# Patient Record
Sex: MALE | Race: WHITE | NOT HISPANIC OR LATINO | Employment: FULL TIME | URBAN - METROPOLITAN AREA
[De-identification: names, ages, dates, MRNs, and addresses within clinical notes are randomized per-mention and may not be internally consistent; named-entity substitution may affect disease eponyms.]

---

## 2018-05-13 ENCOUNTER — APPOINTMENT (OUTPATIENT)
Dept: GENERAL RADIOLOGY | Facility: CLINIC | Age: 48
End: 2018-05-13
Attending: EMERGENCY MEDICINE
Payer: COMMERCIAL

## 2018-05-13 ENCOUNTER — APPOINTMENT (OUTPATIENT)
Dept: CT IMAGING | Facility: CLINIC | Age: 48
End: 2018-05-13
Attending: EMERGENCY MEDICINE
Payer: COMMERCIAL

## 2018-05-13 ENCOUNTER — HOSPITAL ENCOUNTER (EMERGENCY)
Facility: CLINIC | Age: 48
Discharge: HOME | End: 2018-05-13
Attending: EMERGENCY MEDICINE | Admitting: EMERGENCY MEDICINE
Payer: COMMERCIAL

## 2018-05-13 VITALS
OXYGEN SATURATION: 94 % | WEIGHT: 236.2 LBS | TEMPERATURE: 98 F | HEART RATE: 62 BPM | RESPIRATION RATE: 16 BRPM | DIASTOLIC BLOOD PRESSURE: 87 MMHG | SYSTOLIC BLOOD PRESSURE: 137 MMHG

## 2018-05-13 DIAGNOSIS — M54.2 NECK PAIN: ICD-10-CM

## 2018-05-13 DIAGNOSIS — R07.89 ATYPICAL CHEST PAIN: ICD-10-CM

## 2018-05-13 DIAGNOSIS — R06.02 SHORTNESS OF BREATH: ICD-10-CM

## 2018-05-13 LAB
ANION GAP SERPL CALCULATED.3IONS-SCNC: 7 MMOL/L (ref 3–14)
BASOPHILS # BLD AUTO: 0 10E9/L (ref 0–0.2)
BASOPHILS NFR BLD AUTO: 0 %
BUN SERPL-MCNC: 12 MG/DL (ref 7–30)
CALCIUM SERPL-MCNC: 8.3 MG/DL (ref 8.5–10.1)
CHLORIDE SERPL-SCNC: 107 MMOL/L (ref 94–109)
CO2 SERPL-SCNC: 27 MMOL/L (ref 20–32)
CREAT SERPL-MCNC: 0.76 MG/DL (ref 0.66–1.25)
D DIMER PPP FEU-MCNC: 0.7 UG/ML FEU (ref 0–0.5)
DIFFERENTIAL METHOD BLD: ABNORMAL
EOSINOPHIL # BLD AUTO: 0.2 10E9/L (ref 0–0.7)
EOSINOPHIL NFR BLD AUTO: 2 %
ERYTHROCYTE [DISTWIDTH] IN BLOOD BY AUTOMATED COUNT: 12.8 % (ref 10–15)
GFR SERPL CREATININE-BSD FRML MDRD: >90 ML/MIN/1.7M2
GLUCOSE SERPL-MCNC: 110 MG/DL (ref 70–99)
HCT VFR BLD AUTO: 45.8 % (ref 40–53)
HGB BLD-MCNC: 15.3 G/DL (ref 13.3–17.7)
LYMPHOCYTES # BLD AUTO: 6.3 10E9/L (ref 0.8–5.3)
LYMPHOCYTES NFR BLD AUTO: 72 %
MCH RBC QN AUTO: 28.8 PG (ref 26.5–33)
MCHC RBC AUTO-ENTMCNC: 33.4 G/DL (ref 31.5–36.5)
MCV RBC AUTO: 86 FL (ref 78–100)
MONOCYTES # BLD AUTO: 0.5 10E9/L (ref 0–1.3)
MONOCYTES NFR BLD AUTO: 6 %
NEUTROPHILS # BLD AUTO: 1.7 10E9/L (ref 1.6–8.3)
NEUTROPHILS NFR BLD AUTO: 20 %
PLATELET # BLD AUTO: 120 10E9/L (ref 150–450)
PLATELET # BLD EST: ABNORMAL 10*3/UL
POTASSIUM SERPL-SCNC: 3.8 MMOL/L (ref 3.4–5.3)
RBC # BLD AUTO: 5.32 10E12/L (ref 4.4–5.9)
RBC MORPH BLD: NORMAL
SODIUM SERPL-SCNC: 141 MMOL/L (ref 133–144)
TROPONIN I SERPL-MCNC: <0.015 UG/L (ref 0–0.04)
VARIANT LYMPHS BLD QL SMEAR: PRESENT
WBC # BLD AUTO: 8.7 10E9/L (ref 4–11)

## 2018-05-13 PROCEDURE — 96360 HYDRATION IV INFUSION INIT: CPT | Performed by: EMERGENCY MEDICINE

## 2018-05-13 PROCEDURE — 25000128 H RX IP 250 OP 636: Performed by: EMERGENCY MEDICINE

## 2018-05-13 PROCEDURE — 84484 ASSAY OF TROPONIN QUANT: CPT | Performed by: EMERGENCY MEDICINE

## 2018-05-13 PROCEDURE — 85379 FIBRIN DEGRADATION QUANT: CPT | Performed by: EMERGENCY MEDICINE

## 2018-05-13 PROCEDURE — 85025 COMPLETE CBC W/AUTO DIFF WBC: CPT | Performed by: EMERGENCY MEDICINE

## 2018-05-13 PROCEDURE — 71260 CT THORAX DX C+: CPT

## 2018-05-13 PROCEDURE — 93010 ELECTROCARDIOGRAM REPORT: CPT | Mod: Z6 | Performed by: EMERGENCY MEDICINE

## 2018-05-13 PROCEDURE — 71045 X-RAY EXAM CHEST 1 VIEW: CPT

## 2018-05-13 PROCEDURE — 70491 CT SOFT TISSUE NECK W/DYE: CPT

## 2018-05-13 PROCEDURE — 96361 HYDRATE IV INFUSION ADD-ON: CPT | Performed by: EMERGENCY MEDICINE

## 2018-05-13 PROCEDURE — 25000125 ZZHC RX 250: Performed by: EMERGENCY MEDICINE

## 2018-05-13 PROCEDURE — 80048 BASIC METABOLIC PNL TOTAL CA: CPT | Performed by: EMERGENCY MEDICINE

## 2018-05-13 PROCEDURE — 99285 EMERGENCY DEPT VISIT HI MDM: CPT | Mod: 25 | Performed by: EMERGENCY MEDICINE

## 2018-05-13 PROCEDURE — 93005 ELECTROCARDIOGRAM TRACING: CPT | Performed by: EMERGENCY MEDICINE

## 2018-05-13 RX ORDER — IOPAMIDOL 755 MG/ML
100 INJECTION, SOLUTION INTRAVASCULAR ONCE
Status: COMPLETED | OUTPATIENT
Start: 2018-05-13 | End: 2018-05-13

## 2018-05-13 RX ADMIN — SODIUM CHLORIDE 100 ML: 9 INJECTION, SOLUTION INTRAVENOUS at 10:29

## 2018-05-13 RX ADMIN — SODIUM CHLORIDE 1000 ML: 9 INJECTION, SOLUTION INTRAVENOUS at 08:46

## 2018-05-13 RX ADMIN — IOPAMIDOL 100 ML: 755 INJECTION, SOLUTION INTRAVENOUS at 10:29

## 2018-05-13 ASSESSMENT — ENCOUNTER SYMPTOMS
ARTHRALGIAS: 0
ABDOMINAL PAIN: 0
DIFFICULTY URINATING: 0
COLOR CHANGE: 0
NECK STIFFNESS: 0
SHORTNESS OF BREATH: 1
FEVER: 0
CONFUSION: 0
HEADACHES: 0
NECK PAIN: 1
NAUSEA: 1
SINUS PRESSURE: 1
EYE REDNESS: 0

## 2018-05-13 NOTE — ED AVS SNAPSHOT
[facility], [place], Emergency Department    [address]    [facility] [address]    Phone:  [phone]    Fax:  [phone]                                       Richard Klein   MRN: [id number]    Department:  [facility], Emergency Department   Date of Visit:  5/13/2018           After Visit Summary Signature Page     I have received my discharge instructions, and my questions have been answered. I have discussed any challenges I see with this plan with the nurse or doctor.    ..........................................................................................................................................  Patient/Patient Representative Signature      ..........................................................................................................................................  Patient Representative Print Name and Relationship to Patient    ..................................................               ................................................  Date                                            Time    ..........................................................................................................................................  Reviewed by Signature/Title    ...................................................              ..............................................  Date                                                            Time

## 2018-05-13 NOTE — ED AVS SNAPSHOT
[facility], Emergency Department    [address]    [place] [address]    Phone:  [phone]    Fax:  [phone]                                       Richard Klein   MRN: [id number]    Department:  [facility], Emergency Department   Date of Visit:  5/13/2018           Patient Information     Date Of Birth          1970        Your diagnoses for this visit were:     Atypical chest pain     Shortness of breath     Neck pain        You were seen by Morales Shetty MD.        Discharge Instructions       Take ranitidine as directed.  Contact your clinic tomorrow to arrange outpatient stress test.    Return if worsening symptoms or other concerns.    Discharge References/Attachments     CHEST PAIN, UNCERTAIN CAUSE (ENGLISH)    GERD (ADULT) (ENGLISH)      24 Hour Appointment Hotline       To make an appointment at any [facility] clinic, call [phone] ([phone]). If you don't have a family doctor or clinic, we will help you find one. [facility] clinics are conveniently located to serve the needs of you and your family.             Review of your medicines      START taking        Dose / Directions Last dose taken    ranitidine 150 MG tablet   Commonly known as:  ZANTAC   Dose:  150 mg   Quantity:  30 tablet        Take 1 tablet (150 mg) by mouth 2 times daily for 15 days   Refills:  0          Our records show that you are taking the medicines listed below. If these are incorrect, please call your family doctor or clinic.        Dose / Directions Last dose taken    AMLODIPINE BESYLATE PO   Dose:  5 mg        Take 5 mg by mouth daily   Refills:  0        AMOXICILLIN PO   Dose:  500 mg   Indication:  root canal        Take 500 mg by mouth   Refills:  0                Prescriptions were sent or printed at these locations (1 Prescription)                   Other Prescriptions                Printed at Department/Unit printer (1 of 1)         ranitidine (ZANTAC) 150 MG tablet               "  Procedures and tests performed during your visit     Basic metabolic panel    CBC with platelets differential    CT Chest Pulmonary Embolism w Contrast    CT Soft Tissue Neck w Contrast    Chest  XR, 1 view portable    D dimer quantitative    EKG 12 lead    Peripheral IV catheter    Troponin I      Orders Needing Specimen Collection     None      Pending Results     No orders found from 2018 to 2018.            Pending Culture Results     No orders found from 2018 to 2018.            Pending Results Instructions     If you had any lab results that were not finalized at the time of your Discharge, you can call the ED Lab Result RN at [phone]. You will be contacted by this team for any positive Lab results or changes in treatment. The nurses are available 7 days a week from 10A to 6:30P.  You can leave a message 24 hours per day and they will return your call.        Thank you for choosing [facility]       Thank you for choosing [facility] for your care. Our goal is always to provide you with excellent care. Hearing back from our patients is one way we can continue to improve our services. Please take a few minutes to complete the written survey that you may receive in the mail after you visit with us. Thank you!        [employer] Information     [employer] lets you send messages to your doctor, view your test results, renew your prescriptions, schedule appointments and more. To sign up, go to [email or web address] . Click on \"Log in\" on the left side of the screen, which will take you to the Welcome page. Then click on \"Sign up Now\" on the right side of the page.     You will be asked to enter the access code listed below, as well as some personal information. Please follow the directions to create your username and password.     Your access code is: [email or web address]  Expires: 2018 11:15 AM     Your access code will  in 90 days. If you need help or a new code, please call your [facility] " [facility] or [phone].        Care EveryWhere ID     This is your Care EveryWhere ID. This could be used by other organizations to access your [place] medical records  [id number]        Equal Access to Services     CHRIS JEONG : Kayleigh Yip, wasebastianda luqadaha, qaybta kaalmada johan, cr kerr. So [facility] [phone].    ATENCIÓN: Si habla español, tiene a ocasio disposición servicios gratuitos de asistencia lingüística. Llame al [phone].    We comply with applicable federal civil rights laws and [place] laws. We do not discriminate on the basis of race, color, national origin, age, disability, sex, sexual orientation, or gender identity.            After Visit Summary       This is your record. Keep this with you and show to your community pharmacist(s) and doctor(s) at your next visit.

## 2018-05-13 NOTE — ED TRIAGE NOTES
Patient present to ED with complain of shortness of breath, chest pain, and neck pain that started 2 weeks ago and is getting worse. Patient had root canal procedure on 5/8/18 and was prescribed amoxicillin which he is still taking.

## 2018-05-13 NOTE — ED PROVIDER NOTES
History     Chief Complaint   Patient presents with     Chest Wall Pain     Shortness of Breath     Neck Pain     HPI  Richard Klein is a 47 year old male who presents to the emergency department for evaluation of neck pain, chest pain, and shortness of breath.  Patient states that over a week ago, he was lying in bed laughing when he developed some pain in his right lateral neck.  The patient subsequently developed dental pain and sinus pressure.  He ultimately needed a root canal and the symptoms in his jaw, sinus, and neck improved.  He has subsequently been experiencing symptoms of episodic chest pain that he describes as a tightness.  He also feels short of breath and unable to take a deep breath.  He denies any pleuritic chest pain.  He denies any cough.  He denies any leg pain or swelling.  He denies any recent travel or prolonged immobilization.  The patient denies any occurrence of symptoms with exertion.  He states his symptoms are particularly bad at night.  He has had associated nausea but denies any vomiting.  No diaphoresis.  He has a history of hypertension.  No known history of hypercholesterolemia, or diabetes.  Patient denies smoking.  The patient states that his most recent episode of chest pain resolved approximately 30 minutes ago.    I have reviewed the Medications, Allergies, Past Medical and Surgical History, and Social History in the Epic system.    Review of Systems   Constitutional: Negative for fever.   HENT: Positive for sinus pressure (now resolved). Negative for congestion.    Eyes: Negative for redness.   Respiratory: Positive for shortness of breath.    Cardiovascular: Positive for chest pain.   Gastrointestinal: Positive for nausea. Negative for abdominal pain.   Genitourinary: Negative for difficulty urinating.   Musculoskeletal: Positive for neck pain (now resolved). Negative for arthralgias and neck stiffness.   Skin: Negative for color change.   Neurological: Negative for  headaches.   Psychiatric/Behavioral: Negative for confusion.   All other systems reviewed and are negative.      Physical Exam   BP: 138/88  Pulse: 76  Temp: 98  F (36.7  C)  Resp: 16  Weight: 107.1 kg (236 lb 3.2 oz)  SpO2: 95 %      Physical Exam   Constitutional: He appears well-developed and well-nourished. No distress.   HENT:   Head: Normocephalic and atraumatic.   Mouth/Throat: Oropharynx is clear and moist. No oropharyngeal exudate.   Eyes: Pupils are equal, round, and reactive to light. No scleral icterus.   Neck: Normal range of motion. Neck supple. Normal carotid pulses present. Carotid bruit is not present.   Cardiovascular: Normal rate, regular rhythm, normal heart sounds and intact distal pulses.    Pulses:       Dorsalis pedis pulses are 2+ on the right side, and 2+ on the left side.   Pulmonary/Chest: Effort normal and breath sounds normal. No respiratory distress.   Abdominal: Soft. Bowel sounds are normal. There is no tenderness.   Musculoskeletal: Normal range of motion. He exhibits no edema or tenderness.   Neurological: He is alert. He has normal strength. Coordination normal.   Skin: Skin is warm and dry. No rash noted. He is not diaphoretic.   Nursing note and vitals reviewed.      ED Course     ED Course     Procedures             EKG Interpretation:      Interpreted by ROSE MARIE DANG MD  Time reviewed: 0739  Symptoms at time of EKG: none   Rhythm: normal sinus   Rate: normal  Axis: normal  Ectopy: none  Conduction: normal  ST Segments/ T Waves: No ST-T wave changes  Q Waves: none  Comparison to prior: No old EKG available    Clinical Impression: normal EKG    Results for orders placed or performed during the hospital encounter of 05/13/18   Chest  XR, 1 view portable    Narrative    XR CHEST PORT 1 VW  5/13/2018 8:02 AM     HISTORY:  CP;     COMPARISON: None.    FINDINGS:  The heart and pulmonary vasculature are normal. The lungs  are clear.      Impression    IMPRESSION: No active areas of  infiltrate.    CLARA HOBBS MD   CT Chest Pulmonary Embolism w Contrast    Narrative    CT CHEST PULMONARY EMBOLISM WITH CONTRAST May 13, 2018 10:28 AM     HISTORY: Dyspnea.      TECHNIQUE: Thin section axial images are performed from the thoracic  inlet to the lung bases utilizing 100 mL of Isovue 370 IV contrast  without adverse event. Coronal reformatted images are also generated.  Radiation dose for this scan was reduced using automated exposure  control, adjustment of the mA and/or kV according to patient size, or  iterative reconstruction technique.    FINDINGS:     Chest: The lungs are clear. Only mild dependent atelectasis is present  at the lung bases. No pleural or pericardial fluid. Heart size appears  mildly prominent. No enlarged lymph nodes in the chest or axillas. No  evidence of pulmonary embolism. Thoracic aorta is unremarkable.  Limited images upper abdomen demonstrate hepatosplenomegaly. The liver  measures nearly 20 cm in AP dimension and the spleen measures over 13  cm in AP dimension. Bone window examination is unremarkable.      Impression    IMPRESSION:  1. No evidence of pulmonary embolism. Thoracic aorta is unremarkable.  2. Lungs are clear. No enlarged lymph nodes.  3. Mild hepatosplenomegaly.    NIYAH MAY MD   CT Soft Tissue Neck w Contrast    Narrative    CT SCAN OF THE NECK WITH CONTRAST  5/13/2018 10:30 AM     HISTORY: neck pain- recent root canal;     TECHNIQUE:  Axial images and coronal reformations. Radiation dose for  this scan was reduced using automated exposure control, adjustment of  the mA and/or kV according to patient size, or iterative  reconstruction technique. IV.    COMPARISON: None.    FINDINGS: Longus colli muscles appear normal. The styloid processes  are normal in length.    Visualized sinuses, nasopharynx and orbits: Normal.      Tongue, oral cavity and oropharynx:  No soft tissue abscess is seen in  the region of the mandible or oral cavity. The palatine tonsils  appear  normal.      Hypopharynx: Normal.      Larynx and trachea: Normal.      Thyroid: Normal.    Submandibular glands: Normal.      Parotid glands: Normal.        Lymph nodes: Normal.      Vasculature: Normal.      Upper mediastinum and lungs: Normal.      Bones: Mild degenerative changes are seen at C5-6 and C6-7.      Impression    IMPRESSION:   No evidence for any face or neck abscess. The palatine  tonsils appear normal.    CLARA HOBBS MD   CBC with platelets differential   Result Value Ref Range    WBC 8.7 4.0 - 11.0 10e9/L    RBC Count 5.32 4.4 - 5.9 10e12/L    Hemoglobin 15.3 13.3 - 17.7 g/dL    Hematocrit 45.8 40.0 - 53.0 %    MCV 86 78 - 100 fl    MCH 28.8 26.5 - 33.0 pg    MCHC 33.4 31.5 - 36.5 g/dL    RDW 12.8 10.0 - 15.0 %    Platelet Count 120 (L) 150 - 450 10e9/L    Diff Method Manual Differential     % Neutrophils 20.0 %    % Lymphocytes 72.0 %    % Monocytes 6.0 %    % Eosinophils 2.0 %    % Basophils 0.0 %    Absolute Neutrophil 1.7 1.6 - 8.3 10e9/L    Absolute Lymphocytes 6.3 (H) 0.8 - 5.3 10e9/L    Absolute Monocytes 0.5 0.0 - 1.3 10e9/L    Absolute Eosinophils 0.2 0.0 - 0.7 10e9/L    Absolute Basophils 0.0 0.0 - 0.2 10e9/L    Reactive Lymphs Present     RBC Morphology Normal     Platelet Estimate Confirming automated cell count    Basic metabolic panel   Result Value Ref Range    Sodium 141 133 - 144 mmol/L    Potassium 3.8 3.4 - 5.3 mmol/L    Chloride 107 94 - 109 mmol/L    Carbon Dioxide 27 20 - 32 mmol/L    Anion Gap 7 3 - 14 mmol/L    Glucose 110 (H) 70 - 99 mg/dL    Urea Nitrogen 12 7 - 30 mg/dL    Creatinine 0.76 0.66 - 1.25 mg/dL    GFR Estimate >90 >60 mL/min/1.7m2    GFR Estimate If Black >90 >60 mL/min/1.7m2    Calcium 8.3 (L) 8.5 - 10.1 mg/dL   D dimer quantitative   Result Value Ref Range    D Dimer 0.7 (H) 0.0 - 0.50 ug/ml FEU   Troponin I   Result Value Ref Range    Troponin I ES <0.015 0.000 - 0.045 ug/L          Critical Care time:               Assessments & Plan (with Medical  Decision Making)   47 year old male to the emergency department with several days of chest pain, dyspnea, and neck pain.  The patient's EKG is normal and his troponin is negative.  The patient recently underwent a root canal.  The patient's d-dimer was mildly elevated so CT of the chest was performed as well as CT of the neck.  There is no evidence for pulmonary embolus or abnormality in the neck to account for the patient's symptoms.  Patient's labs are otherwise unremarkable.  Possible etiologies for the patient's symptoms include GERD.  Angina also possible but less likely.  Recommendation was made for stress test.  Patient prefers to pursue that an outpatient basis.  He declines admission to the observation unit for chest pain protocol.  Patient will be discharged home.  A trial of ranitidine will be prescribed.    I have reviewed the nursing notes.    I have reviewed the findings, diagnosis, plan and need for follow up with the patient.    Discharge Medication List as of 5/13/2018 11:16 AM      START taking these medications    Details   ranitidine (ZANTAC) 150 MG tablet Take 1 tablet (150 mg) by mouth 2 times daily for 15 days, Disp-30 tablet, R-0, Local Print             Final diagnoses:   Atypical chest pain   Shortness of breath   Neck pain       5/13/2018   [facility], [place], EMERGENCY DEPARTMENT     Morales Shetty MD  05/13/18 1126

## 2018-05-14 LAB — INTERPRETATION ECG - MUSE: NORMAL

## 2019-08-03 NOTE — DISCHARGE INSTRUCTIONS
Take ranitidine as directed.  Contact your clinic tomorrow to arrange outpatient stress test.    Return if worsening symptoms or other concerns.  
1+ pitting edmea b/l LE

## 2021-08-26 ENCOUNTER — HOSPITAL ENCOUNTER (EMERGENCY)
Facility: CLINIC | Age: 51
Discharge: HOME | End: 2021-08-26
Attending: NURSE PRACTITIONER | Admitting: NURSE PRACTITIONER
Payer: COMMERCIAL

## 2021-08-26 VITALS
TEMPERATURE: 97.8 F | HEART RATE: 66 BPM | SYSTOLIC BLOOD PRESSURE: 151 MMHG | DIASTOLIC BLOOD PRESSURE: 91 MMHG | HEIGHT: 70 IN | OXYGEN SATURATION: 98 % | BODY MASS INDEX: 35.07 KG/M2 | RESPIRATION RATE: 18 BRPM | WEIGHT: 245 LBS

## 2021-08-26 DIAGNOSIS — K08.89 PAIN, DENTAL: ICD-10-CM

## 2021-08-26 DIAGNOSIS — K04.7 DENTAL ABSCESS: ICD-10-CM

## 2021-08-26 PROBLEM — I10 ESSENTIAL HYPERTENSION: Status: ACTIVE | Noted: 2018-04-03

## 2021-08-26 PROCEDURE — 10060 I&D ABSCESS SIMPLE/SINGLE: CPT

## 2021-08-26 PROCEDURE — 99283 EMERGENCY DEPT VISIT LOW MDM: CPT

## 2021-08-26 PROCEDURE — 250N000011 HC RX IP 250 OP 636: Performed by: EMERGENCY MEDICINE

## 2021-08-26 RX ORDER — ONDANSETRON 4 MG/1
4 TABLET, ORALLY DISINTEGRATING ORAL ONCE
Status: COMPLETED | OUTPATIENT
Start: 2021-08-26 | End: 2021-08-26

## 2021-08-26 RX ORDER — BUPIVACAINE HYDROCHLORIDE AND EPINEPHRINE 5; 5 MG/ML; UG/ML
1.8 INJECTION, SOLUTION PERINEURAL ONCE
Status: DISCONTINUED | OUTPATIENT
Start: 2021-08-26 | End: 2021-08-27 | Stop reason: HOSPADM

## 2021-08-26 RX ADMIN — ONDANSETRON 4 MG: 4 TABLET, ORALLY DISINTEGRATING ORAL at 20:52

## 2021-08-26 ASSESSMENT — MIFFLIN-ST. JEOR: SCORE: 1972.56

## 2021-08-26 ASSESSMENT — ENCOUNTER SYMPTOMS
FEVER: 0
CHILLS: 0
TROUBLE SWALLOWING: 0
VOICE CHANGE: 0

## 2021-08-27 NOTE — DISCHARGE INSTRUCTIONS
Take ibuprofen 600 mg every 6 hours for the next 3-5 days or until followup with dentist. If given other medications used as directed.  Use Oil of Clove topically. If given antibiotics make sure and finish the entire course. Ultimately you need to follow up with your dentist. If you cannot get into your dentist in the next couple of days. You will need to get further pain management from your dentist. Return to emergency room if you develop high fevers, difficulty breathing, shortness of breath, the inability to swallow your own saliva, or for other concerns.

## 2021-08-27 NOTE — ED PROVIDER NOTES
"  History     Chief Complaint:  Dental Pain       HPI   Richard Klein is a 50 year old male who presents with left lower molar pain. He reports that the pain has been persistent for the last 2 weeks. He reports being seen in the past for a root canal. Yesterday he was found to have a severe infection by his dentist and was prescribed amoxicillin. His pain dramatically increased today around 1800 and he also reports having gum sensitivity. He also reports that the pain seems to radiate up his head. He reports taking tylenol, ibuprofen, and one oxycodone pill.  He did take 1 tablet of his wife's \"old\" oxycodone.  However he stated this did not have an effect.  Describes approximately 10 minutes ago the abrupt cessation of pain.  Currently has no pain.      Review of Systems   Constitutional: Negative for chills and fever.   HENT: Negative for trouble swallowing and voice change.         Positive for dental pain   All other systems reviewed and are negative.      Allergies:  The patient has no known allergies.    Medications:    Amlodipine besylate PO  Revatio  Amoxicillin   Norvasac    Past Medical History:    Hypertension      Social History:  Patient was unaccompanied      Physical Exam     Patient Vitals for the past 24 hrs:   BP Temp Temp src Pulse Resp SpO2 Height Weight   08/26/21 2049 (!) 208/124 97.8  F (36.6  C) Temporal 69 18 98 % 1.778 m (5' 10\") 111.1 kg (245 lb)       Physical Exam  General: Well-nourished, No obvious discomfort  Eyes: PERRL, conjunctivae pink no scleral icterus or conjunctival injection  ENT:  Moist mucus membranes.  Tooth # 19 with a mcdonnell colored.  No drainage.  Yes abscess along gumline.  No cheek or submandibular edema.  No trismus.  Normal voice.  Respiratory:  Normal respiratory effort. No cough  CV: Normal rate   Musculoskeletal: No peripheral edema or calf tenderness  Neuro: Alert and oriented to person/place/time  Skin: Warm and dry. Normal appearance of visualized exposed " skin  Psychiatric: Affect normal. Normal personal interaction. Good eye contact.    Emergency Department Course       Procedures:    Incision and Drainage     LOCATIONS: Tooth #19     ANESTHESIA:  Local field block using Marcaine 0.5% with epinephrine, total of 1.8 mLs     PROCEDURE:  Area was incised with # 11 Blade (Sharp Point) with a Single Straight incision.  Wound treatment included Purulent Drainage.  Packing consisted of No Packing.  Appropriate dressing was applied to cover the area.    PATIENT STATUS:        Patient tolerated the procedure well. There were no complications.               Emergency Department Course:    Reviewed:  I reviewed nursing notes, vitals, past history and care everywhere    Assessments:  2142 I obtained history and examined the patient as noted above.   2151 I rechecked the patient and explained findings.     Interventions:  2052 Zofran, 4 mg, oral      Disposition:  The patient was discharged to home.    Impression & Plan        Medical Decision Making:  Richard Klein is a 50 year old male who presented for evaluation of dental pain and facial swelling.  Exam showed dental abscess.  I & D preformed as above. Patient already on Amoxicillin. Advised to use Ibuprofen or tylenol for discomfort.  No indication for deeper space infection, PTA, retropharyngeal abscess, facial cellulitis, or Arvind's angina.  Follow up with dentist in the next 2-3 days or immediately if worsening.  Return to ED if develops difficulty swallowing, high fevers (not controlled with medications), worsening swelling, or for other concerns.      Diagnosis:    ICD-10-CM    1. Dental abscess  K04.7    2. Pain, dental  K08.89            Scribe Disclosure:  Praveen DOE, am serving as a scribe at 9:34 PM on 8/26/2021 to document services personally performed by Reji Romano APRN based on my observations and the provider's statements to me.      Reji Romano APRN CNP  08/26/21 8949

## 2021-11-14 ENCOUNTER — HEALTH MAINTENANCE LETTER (OUTPATIENT)
Age: 51
End: 2021-11-14

## 2022-11-20 ENCOUNTER — HEALTH MAINTENANCE LETTER (OUTPATIENT)
Age: 52
End: 2022-11-20

## 2023-11-25 ENCOUNTER — HEALTH MAINTENANCE LETTER (OUTPATIENT)
Age: 53
End: 2023-11-25